# Patient Record
Sex: MALE | Race: WHITE | NOT HISPANIC OR LATINO | Employment: FULL TIME | URBAN - NONMETROPOLITAN AREA
[De-identification: names, ages, dates, MRNs, and addresses within clinical notes are randomized per-mention and may not be internally consistent; named-entity substitution may affect disease eponyms.]

---

## 2024-05-14 ENCOUNTER — APPOINTMENT (OUTPATIENT)
Dept: CARDIOLOGY | Facility: CLINIC | Age: 59
End: 2024-05-14
Payer: COMMERCIAL

## 2024-05-21 ENCOUNTER — APPOINTMENT (OUTPATIENT)
Dept: CARDIOLOGY | Facility: CLINIC | Age: 59
End: 2024-05-21
Payer: COMMERCIAL

## 2024-05-24 ENCOUNTER — OFFICE VISIT (OUTPATIENT)
Dept: CARDIOLOGY | Facility: CLINIC | Age: 59
End: 2024-05-24
Payer: COMMERCIAL

## 2024-05-24 VITALS
SYSTOLIC BLOOD PRESSURE: 150 MMHG | HEIGHT: 73 IN | HEART RATE: 72 BPM | BODY MASS INDEX: 39.36 KG/M2 | DIASTOLIC BLOOD PRESSURE: 90 MMHG | WEIGHT: 297 LBS

## 2024-05-24 DIAGNOSIS — R06.09 DYSPNEA ON EXERTION: Primary | ICD-10-CM

## 2024-05-24 DIAGNOSIS — G47.30 SLEEP APNEA TREATED WITH CONTINUOUS POSITIVE AIRWAY PRESSURE (CPAP): ICD-10-CM

## 2024-05-24 DIAGNOSIS — F17.200 SMOKER: ICD-10-CM

## 2024-05-24 DIAGNOSIS — I10 ESSENTIAL HYPERTENSION: ICD-10-CM

## 2024-05-24 DIAGNOSIS — Z82.49 FAMILY HISTORY OF ISCHEMIC HEART DISEASE: ICD-10-CM

## 2024-05-24 PROBLEM — G47.33 OBSTRUCTIVE SLEEP APNEA ON CPAP: Status: ACTIVE | Noted: 2024-05-24

## 2024-05-24 PROCEDURE — 93000 ELECTROCARDIOGRAM COMPLETE: CPT | Performed by: INTERNAL MEDICINE

## 2024-05-24 PROCEDURE — 99204 OFFICE O/P NEW MOD 45 MIN: CPT | Performed by: INTERNAL MEDICINE

## 2024-05-24 PROCEDURE — 3077F SYST BP >= 140 MM HG: CPT | Performed by: INTERNAL MEDICINE

## 2024-05-24 PROCEDURE — 3080F DIAST BP >= 90 MM HG: CPT | Performed by: INTERNAL MEDICINE

## 2024-05-24 PROCEDURE — 3008F BODY MASS INDEX DOCD: CPT | Performed by: INTERNAL MEDICINE

## 2024-05-24 RX ORDER — GABAPENTIN 600 MG/1
600 TABLET ORAL 2 TIMES DAILY
COMMUNITY

## 2024-05-24 RX ORDER — IBUPROFEN 600 MG/1
600 TABLET ORAL EVERY 6 HOURS PRN
COMMUNITY

## 2024-05-24 RX ORDER — ALBUTEROL SULFATE 0.83 MG/ML
2.5 SOLUTION RESPIRATORY (INHALATION)
COMMUNITY

## 2024-05-24 RX ORDER — LOSARTAN POTASSIUM AND HYDROCHLOROTHIAZIDE 12.5; 1 MG/1; MG/1
1 TABLET ORAL DAILY
COMMUNITY

## 2024-05-24 RX ORDER — BISMUTH SUBSALICYLATE 262 MG
1 TABLET,CHEWABLE ORAL DAILY
COMMUNITY

## 2024-05-24 NOTE — PROGRESS NOTES
Cardiology Consultation- New Consult    Reason for referral: Dyspnea on exertion    HPI: Anthony Mauricio is a 58 y.o. male who is referred for evaluation of symptoms of dyspnea on exertion and chest pain.  Patient has strong family history of coronary artery disease and has multiple risk factor for CAD including hypertension, active tobacco abuse and obesity.  He has sleep apnea using CPAP machine.  He works in a local [employer] and his job seems to be physically demanding.  He has noticed dyspnea on exertion recently and had one-time visit to the emergency department for evaluation where his workup was negative.  He has no previous cardiac investigations.  He has no syncope, palpitations, orthopnea PND or lower extremity edema.  He is hypertensive on medical therapy for the last 10 years.  His pressure was elevated today and his EKG revealed normal sinus rhythm with no significant abnormalities.  His cardiovascular and pulmonary examinations were normal.    Assessment/recommendations:    1-multiple risk factor for CAD with symptoms of dyspnea on exertion.  The patient was advised undergo treadmill stress test and coronary calcium score for further risk stratification.  Based on the results further advised to be provided  2-essential hypertension currently not controlled on present medical therapy.  Will address that further after completing his cardiac workup.  Low-salt diet, DASH diet was recommended weight loss was highly recommended  3-sleep apnea compliant with CPAP machine  4-class II obesity, encouragement provided for low calorie diet and more exercise.  5-patient was advised to obtain a set of blood work including lipid profile, CBC, basic metabolic profile and urine test.      Past Medical History:   He has no past medical history on file.    Surgical History:   He has a past surgical history that includes Hernia repair and Anterior cruciate ligament repair.    Family History:   Family History   Problem  "Relation Name Age of Onset    Heart attack Mother      Heart failure Mother      Diabetes type I Sister      Heart attack Brother         Social History:   Social History     Tobacco Use    Smoking status: Every Day     Types: Cigarettes    Smokeless tobacco: Never   Substance Use Topics    Alcohol use: Never        Allergies:  Penicillins     Current Medications:    Current Outpatient Medications:     albuterol 2.5 mg /3 mL (0.083 %) nebulizer solution, Take 3 mL (2.5 mg) by nebulization., Disp: , Rfl:     gabapentin (Neurontin) 600 mg tablet, Take 1 tablet (600 mg) by mouth 2 times a day., Disp: , Rfl:     ibuprofen 600 mg tablet, Take 1 tablet (600 mg) by mouth every 6 hours if needed for mild pain (1 - 3)., Disp: , Rfl:     losartan-hydrochlorothiazide (Hyzaar) 100-12.5 mg tablet, Take 1 tablet by mouth once daily., Disp: , Rfl:     multivitamin tablet, Take 1 tablet by mouth once daily., Disp: , Rfl:      Vitals:  Vitals:    05/24/24 0844 05/24/24 0845 05/24/24 0857   BP: (!) 148/108 (!) 150/102 150/90   BP Location: Left arm Right arm Right arm   Patient Position: Sitting Sitting Sitting   Pulse: 72     Weight: 135 kg (297 lb)     Height: 1.854 m (6' 1\")            ROS    Objective         Physical Exam  Constitutional:       Appearance: Normal appearance.   HENT:      Nose: Nose normal.   Neck:      Vascular: No carotid bruit.   Cardiovascular:      Rate and Rhythm: Normal rate.      Pulses: Normal pulses.      Heart sounds: Normal heart sounds.   Pulmonary:      Effort: Pulmonary effort is normal.   Abdominal:      General: Bowel sounds are normal.      Palpations: Abdomen is soft.   Musculoskeletal:         General: Normal range of motion.      Cervical back: Normal range of motion.      Right lower leg: No edema.      Left lower leg: No edema.   Skin:     General: Skin is warm and dry.   Neurological:      General: No focal deficit present.      Mental Status: He is alert.   Psychiatric:         Mood and " Affect: Mood normal.         Behavior: Behavior normal.         Thought Content: Thought content normal.         Judgment: Judgment normal.                Assessment and Plan:   1. Dyspnea on exertion        2. Essential hypertension  Follow Up In Cardiology    ECG 12 Lead    Stress Test    CT cardiac scoring wo IV contrast    Basic Metabolic Panel    CBC    Alanine Aminotransferase    Aspartate Aminotransferase    Uric Acid    Basic Metabolic Panel    CBC    Alanine Aminotransferase    Aspartate Aminotransferase    Uric Acid      3. BMI 39.0-39.9,adult        4. Smoker  CT cardiac scoring wo IV contrast    Lipid Panel    Basic Metabolic Panel    CBC    Alanine Aminotransferase    Aspartate Aminotransferase    Uric Acid    Lipid Panel    Basic Metabolic Panel    CBC    Alanine Aminotransferase    Aspartate Aminotransferase    Uric Acid      5. Family history of ischemic heart disease  ECG 12 Lead    Stress Test    CT cardiac scoring wo IV contrast    Lipid Panel    Basic Metabolic Panel    CBC    Alanine Aminotransferase    Aspartate Aminotransferase    Uric Acid    Lipid Panel    Basic Metabolic Panel    CBC    Alanine Aminotransferase    Aspartate Aminotransferase    Uric Acid      6. Sleep apnea treated with continuous positive airway pressure (CPAP)  ECG 12 Lead    Stress Test    CT cardiac scoring wo IV contrast    Lipid Panel    Basic Metabolic Panel    CBC    Alanine Aminotransferase    Aspartate Aminotransferase    Uric Acid    Lipid Panel    Basic Metabolic Panel    CBC    Alanine Aminotransferase    Aspartate Aminotransferase    Uric Acid             Scribe Attestation  By signing my name below, INoreen LPN, Scribe   attest that this documentation has been prepared under the direction and in the presence of Oscar Joy MD.    Provider Attestation - Scribe documentation    All medical record entries made by the Scribe were at my direction and personally dictated by me. I have reviewed the chart  and agree that the record accurately reflects my personal performance of the history, physical exam, discussion and plan.

## 2024-05-24 NOTE — PATIENT INSTRUCTIONS
Current weight: 135 kg (297 lb)  Weight change since last visit (-) denotes wt loss 297 lbs   Weight loss needed to achieve BMI 25: 107.9 Lbs  Weight loss needed to achieve BMI 30: 70.1 Lbs  Please bring all medicines, vitamins, and herbal supplements with you when you come to the office.    Prescriptions will not be filled unless you are compliant with your follow up appointments or have a follow up appointment scheduled as per instruction of your physician. Refills should be requested at the time of your visit.

## 2024-05-24 NOTE — LETTER
May 24, 2024     Raeann Cevallos, APRN-CNP  [address] [facility] [id number]    Patient: Anthony Mauricio   YOB: 1965   Date of Visit: 5/24/2024       Dear Dr. Raeann Cevallos, APRN-CNP:    Thank you for referring Anthony Mauricio to me for evaluation. Below are my notes for this consultation.  If you have questions, please do not hesitate to call me. I look forward to following your patient along with you.       Sincerely,     Oscar Joy MD      CC: No Recipients  ______________________________________________________________________________________    Cardiology Consultation- New Consult    Reason for referral: Dyspnea on exertion    HPI: Anthony Mauricio is a 58 y.o. male who is referred for evaluation of symptoms of dyspnea on exertion and chest pain.  Patient has strong family history of coronary artery disease and has multiple risk factor for CAD including hypertension, active tobacco abuse and obesity.  He has sleep apnea using CPAP machine.  He works in a local [employer] and his job seems to be physically demanding.  He has noticed dyspnea on exertion recently and had one-time visit to the emergency department for evaluation where his workup was negative.  He has no previous cardiac investigations.  He has no syncope, palpitations, orthopnea PND or lower extremity edema.  He is hypertensive on medical therapy for the last 10 years.  His pressure was elevated today and his EKG revealed normal sinus rhythm with no significant abnormalities.  His cardiovascular and pulmonary examinations were normal.    Assessment/recommendations:    1-multiple risk factor for CAD with symptoms of dyspnea on exertion.  The patient was advised undergo treadmill stress test and coronary calcium score for further risk stratification.  Based on the results further advised to be provided  2-essential hypertension currently not controlled on present medical  "therapy.  Will address that further after completing his cardiac workup.  Low-salt diet, DASH diet was recommended weight loss was highly recommended  3-sleep apnea compliant with CPAP machine  4-class II obesity, encouragement provided for low calorie diet and more exercise.  5-patient was advised to obtain a set of blood work including lipid profile, CBC, basic metabolic profile and urine test.      Past Medical History:   He has no past medical history on file.    Surgical History:   He has a past surgical history that includes Hernia repair and Anterior cruciate ligament repair.    Family History:   Family History   Problem Relation Name Age of Onset   • Heart attack Mother     • Heart failure Mother     • Diabetes type I Sister     • Heart attack Brother         Social History:   Social History     Tobacco Use   • Smoking status: Every Day     Types: Cigarettes   • Smokeless tobacco: Never   Substance Use Topics   • Alcohol use: Never        Allergies:  Penicillins     Current Medications:    Current Outpatient Medications:   •  albuterol 2.5 mg /3 mL (0.083 %) nebulizer solution, Take 3 mL (2.5 mg) by nebulization., Disp: , Rfl:   •  gabapentin (Neurontin) 600 mg tablet, Take 1 tablet (600 mg) by mouth 2 times a day., Disp: , Rfl:   •  ibuprofen 600 mg tablet, Take 1 tablet (600 mg) by mouth every 6 hours if needed for mild pain (1 - 3)., Disp: , Rfl:   •  losartan-hydrochlorothiazide (Hyzaar) 100-12.5 mg tablet, Take 1 tablet by mouth once daily., Disp: , Rfl:   •  multivitamin tablet, Take 1 tablet by mouth once daily., Disp: , Rfl:      Vitals:  Vitals:    05/24/24 0844 05/24/24 0845 05/24/24 0857   BP: (!) 148/108 (!) 150/102 150/90   BP Location: Left arm Right arm Right arm   Patient Position: Sitting Sitting Sitting   Pulse: 72     Weight: 135 kg (297 lb)     Height: 1.854 m (6' 1\")            ROS    Objective        Physical Exam  Constitutional:       Appearance: Normal appearance.   HENT:      Nose: " Nose normal.   Neck:      Vascular: No carotid bruit.   Cardiovascular:      Rate and Rhythm: Normal rate.      Pulses: Normal pulses.      Heart sounds: Normal heart sounds.   Pulmonary:      Effort: Pulmonary effort is normal.   Abdominal:      General: Bowel sounds are normal.      Palpations: Abdomen is soft.   Musculoskeletal:         General: Normal range of motion.      Cervical back: Normal range of motion.      Right lower leg: No edema.      Left lower leg: No edema.   Skin:     General: Skin is warm and dry.   Neurological:      General: No focal deficit present.      Mental Status: He is alert.   Psychiatric:         Mood and Affect: Mood normal.         Behavior: Behavior normal.         Thought Content: Thought content normal.         Judgment: Judgment normal.                Assessment and Plan:   1. Dyspnea on exertion        2. Essential hypertension  Follow Up In Cardiology    ECG 12 Lead    Stress Test    CT cardiac scoring wo IV contrast    Basic Metabolic Panel    CBC    Alanine Aminotransferase    Aspartate Aminotransferase    Uric Acid    Basic Metabolic Panel    CBC    Alanine Aminotransferase    Aspartate Aminotransferase    Uric Acid      3. BMI 39.0-39.9,adult        4. Smoker  CT cardiac scoring wo IV contrast    Lipid Panel    Basic Metabolic Panel    CBC    Alanine Aminotransferase    Aspartate Aminotransferase    Uric Acid    Lipid Panel    Basic Metabolic Panel    CBC    Alanine Aminotransferase    Aspartate Aminotransferase    Uric Acid      5. Family history of ischemic heart disease  ECG 12 Lead    Stress Test    CT cardiac scoring wo IV contrast    Lipid Panel    Basic Metabolic Panel    CBC    Alanine Aminotransferase    Aspartate Aminotransferase    Uric Acid    Lipid Panel    Basic Metabolic Panel    CBC    Alanine Aminotransferase    Aspartate Aminotransferase    Uric Acid      6. Sleep apnea treated with continuous positive airway pressure (CPAP)  ECG 12 Lead    Stress Test     CT cardiac scoring wo IV contrast    Lipid Panel    Basic Metabolic Panel    CBC    Alanine Aminotransferase    Aspartate Aminotransferase    Uric Acid    Lipid Panel    Basic Metabolic Panel    CBC    Alanine Aminotransferase    Aspartate Aminotransferase    Uric Acid             Scribe Attestation  By signing my name below, I, Ladonna Sorto LPN   attest that this documentation has been prepared under the direction and in the presence of Oscar Joy MD.    Provider Attestation - Scribe documentation    All medical record entries made by the Scribe were at my direction and personally dictated by me. I have reviewed the chart and agree that the record accurately reflects my personal performance of the history, physical exam, discussion and plan.

## 2024-06-17 ENCOUNTER — HOSPITAL ENCOUNTER (OUTPATIENT)
Dept: CARDIOLOGY | Facility: CLINIC | Age: 59
Discharge: HOME | End: 2024-06-17
Payer: COMMERCIAL

## 2024-06-17 DIAGNOSIS — G47.30 SLEEP APNEA TREATED WITH CONTINUOUS POSITIVE AIRWAY PRESSURE (CPAP): ICD-10-CM

## 2024-06-17 DIAGNOSIS — Z82.49 FAMILY HISTORY OF ISCHEMIC HEART DISEASE: ICD-10-CM

## 2024-06-17 DIAGNOSIS — I10 ESSENTIAL HYPERTENSION: ICD-10-CM

## 2024-06-17 PROCEDURE — 93017 CV STRESS TEST TRACING ONLY: CPT

## 2024-06-17 PROCEDURE — 93016 CV STRESS TEST SUPVJ ONLY: CPT | Performed by: INTERNAL MEDICINE

## 2024-06-17 PROCEDURE — 93018 CV STRESS TEST I&R ONLY: CPT | Performed by: INTERNAL MEDICINE

## 2024-07-01 ENCOUNTER — HOSPITAL ENCOUNTER (OUTPATIENT)
Dept: RADIOLOGY | Facility: HOSPITAL | Age: 59
Discharge: HOME | End: 2024-07-01
Payer: COMMERCIAL

## 2024-07-01 DIAGNOSIS — F17.200 SMOKER: ICD-10-CM

## 2024-07-01 DIAGNOSIS — G47.30 SLEEP APNEA TREATED WITH CONTINUOUS POSITIVE AIRWAY PRESSURE (CPAP): ICD-10-CM

## 2024-07-01 DIAGNOSIS — I10 ESSENTIAL HYPERTENSION: ICD-10-CM

## 2024-07-01 DIAGNOSIS — Z82.49 FAMILY HISTORY OF ISCHEMIC HEART DISEASE: ICD-10-CM

## 2024-07-01 PROCEDURE — 75571 CT HRT W/O DYE W/CA TEST: CPT

## 2024-07-03 DIAGNOSIS — R93.1 ELEVATED CORONARY ARTERY CALCIUM SCORE: ICD-10-CM

## 2024-07-03 NOTE — TELEPHONE ENCOUNTER
Informed patient of results, he verbalized understanding. Patient agreeable to medications, orders prepped and sent to Dr. Oscar Joy MD for signature.     Task sent to  to call and arrange once order signed.

## 2024-07-03 NOTE — TELEPHONE ENCOUNTER
Result Communication    Resulted Orders   CT cardiac scoring wo IV contrast    Addendum: 7/2/2024    Interpreted By:  Sami William,   ADDENDUM:  Technical: The following is to serve as an over-read for an  unenhanced cardiac CT, to evaluate the extra vascular structures.      Contiguous unenhanced CT sections are performed from level the alexia  to the upper abdomen.              Findings: There are linear areas of scarring or atelectasis in the  anterior left lower lobe. The visualized portions of both lungs are  otherwise clear.      There is no sign of pathologic lymph node enlargement. There is no  pericardial or pleural effusion.      Images through the upper abdomen are unremarkable aside from a tiny  hiatal hernia.      The visualized osseous and soft tissue structures of the chest wall  are intact.              Impression: Tiny hiatal hernia. The extra vascular structures are  otherwise unremarkable.      Signed by: Sami William 7/2/2024 11:53 AM      -------- ORIGINAL REPORT --------  Dictation workstation:   [id number]      Narrative    Interpreted By:  Sami Vernon,   STUDY:  CT CARDIAC SCORING WO IV CONTRAST; 7/1/2024 4:00 pm      INDICATION:  Signs/Symptoms:hypertension, COPD, family history.      COMPARISON:  None.      ACCESSION NUMBER(S):  [id number]      ORDERING CLINICIAN:  ANAMARIA BISHOP      TECHNIQUE:  Using prospective ECG gating, CT scan of the coronary arteries was  performed without intravenous contrast. Coronary calcium scoring  was  performed according to the method of Agatston.      CT Dose-Length Product (DLP): 63.3 mGy*cm  CT Dose Reduction Employed: Yes, prospective gating, iterative  reconstruction.      FINDINGS:  The score and distribution of calcium in the coronary arteries is as  follows:      LM 0  LAD 1409  LCx 84  RCA 1188      Total 2680      The visualized ascending thoracic aorta measures 3.8 cm in diameter.  The heart is normal in size. No pericardial  effusion is present.          The main pulmonary artery, right and left pulmonary artery are normal  in size.            Impression    1. Coronary artery calcium score of 2680*.  2. FAGAN 99th percentile** for age, gender, and race in asymptomatic  patients.          *Coronary Artery  Agatston score      Score risk  Very low 1-99  Mildly increased 100-299  Moderately increased >300  Moderate to severely increased >800      Citlali et al. JCCT 2016 (http://dx.doi.org/10.1016/j.jcct.2016.11.003)      ** FAGAN Percentile  In general, greater than 75th percentile for age, gender, and race is  considered to be a higher relative risk and higher lifetime risk  condition. Greater than 75th percentile=moderate to severely  increased relative risk irrespective of the score. Advise using AFGAN  10 year CHD risk calculator below for better discrimination of risk.      FAGAN 10-Year CHD Risk with Coronary Artery Calcification can be  calcuate using link below  https://www.fagan-nhlbi.org/MESACHDRisk/[email or web address]/RiskScore.aspx  Sepideh infante al. JACC 2015 (http://dx.doi.org/10.1016/j.j  acc.2015.08.035)      Reading Cardiologist: Dr. Sami Vernon, Date: 7/2/2024 11:49 am      Signed by: Sami Vernon 7/2/2024 11:49 AM  Dictation workstation:   [id number]       12:54 PM      Results were successfully communicated with the patient and they acknowledged their understanding.

## 2024-07-03 NOTE — TELEPHONE ENCOUNTER
----- Message from Oscar Joy MD sent at 7/3/2024 10:54 AM EDT -----  Let him know his coronary calcium score came very high and the disease is located in the front vessel and on the right side vessel.  Given his symptoms of chest pain and his risk factor for coronary artery disease and with his coronary calcium score a cardiac catheterization is recommended, if he is not taking aspirin have him take a baby aspirin and need to be on statin and my recommendation would be rosuvastatin 20 mg daily  ----- Message -----  From: Interface, Radiology Results In  Sent: 7/2/2024  11:51 AM EDT  To: Oscar Joy MD

## 2024-07-04 RX ORDER — ROSUVASTATIN CALCIUM 20 MG/1
20 TABLET, COATED ORAL DAILY
Qty: 90 TABLET | Refills: 3 | Status: SHIPPED | OUTPATIENT
Start: 2024-07-04 | End: 2025-07-04

## 2024-07-04 RX ORDER — ASPIRIN 81 MG/1
81 TABLET ORAL DAILY
Qty: 90 TABLET | Refills: 3 | Status: SHIPPED | OUTPATIENT
Start: 2024-07-04 | End: 2025-07-04

## 2024-07-05 ENCOUNTER — TELEPHONE (OUTPATIENT)
Dept: CARDIOLOGY | Facility: CLINIC | Age: 59
End: 2024-07-05
Payer: COMMERCIAL

## 2024-07-05 NOTE — TELEPHONE ENCOUNTER
HEART CATH/[id number] [id number] NO AUTH REQUIRED PER [facility]

## 2024-07-15 LAB
NON-UH HIE ANION GAP: 9.6 (ref 6–15)
NON-UH HIE BASOPHILS # (AUTO): 0.1 10*3/UL (ref 0–0.2)
NON-UH HIE BASOPHILS % (AUTO): 0.7 %
NON-UH HIE BLOOD UREA NITROGEN: 17 MG/DL (ref 7–25)
NON-UH HIE CARBON DIOXIDE: 27.4 MMOL/L (ref 21–31)
NON-UH HIE CHLORIDE: 104 MMOL/L (ref 98–107)
NON-UH HIE CHOL/HDL RATIO: 5.3
NON-UH HIE CHOLESTEROL: 179 MG/DL (ref 140–200)
NON-UH HIE CREATININE CLR CALC PHARMACY: 111.15
NON-UH HIE CREATININE: 1.07 MG/DL (ref 0.7–1.3)
NON-UH HIE EOSINOPHILS # (AUTO): 0.1 10*3/UL (ref 0–0.45)
NON-UH HIE EOSINOPHILS % (AUTO): 2.1 %
NON-UH HIE ESTIMATED GFR: > 60
NON-UH HIE HDL CHOLESTEROL: 34 MG/DL (ref 23–92)
NON-UH HIE HEMATOCRIT: 45.9 % (ref 38.8–50)
NON-UH HIE HEMOGLOBIN: 15.5 G/DL (ref 13–17)
NON-UH HIE INR: 1
NON-UH HIE LDL CHOLESTEROL,CALCULATED: 108 MG/DL (ref 0–100)
NON-UH HIE LYMPHOCYTES # (AUTO): 1.4 10*3/UL (ref 1–4.8)
NON-UH HIE LYMPHOCYTES % (AUTO): 20.2 %
NON-UH HIE MEAN CORPUSCULAR HEMOGLOBIN: 29.4 PG (ref 27.5–35.2)
NON-UH HIE MEAN CORPUSCULAR HGB CONC: 33.8 G/DL (ref 32.5–35.6)
NON-UH HIE MEAN CORPUSCULAR VOLUME: 86.9 FL (ref 83.5–101)
NON-UH HIE MEAN PLATELET VOLUME: 7.7 FL (ref 6.6–10.1)
NON-UH HIE MONOCYTES # (AUTO): 0.6 10*3/UL (ref 0–0.8)
NON-UH HIE MONOCYTES % (AUTO): 9 %
NON-UH HIE NEUTROPHILS # (AUTO): 4.8 10*3/UL (ref 1.8–7.7)
NON-UH HIE NEUTROPHILS % (AUTO): 68 %
NON-UH HIE NRBC%: 0.1 /100{WBC} (ref 0–0.5)
NON-UH HIE PARTIAL THROMBOPLASTIN TIME: 34.8 S (ref 25.1–36.5)
NON-UH HIE PLATELET COUNT: 233 10*3/UL (ref 150–450)
NON-UH HIE POTASSIUM: 4 MMOL/L (ref 3.5–5.1)
NON-UH HIE PROTHROMBIN TIME: 11.1 S (ref 9–12.9)
NON-UH HIE RED BLOOD COUNT: 5.27 (ref 3.9–5.6)
NON-UH HIE RED CELL DISTRIBUTION WIDTH: 14.9 % (ref 12–14.8)
NON-UH HIE SODIUM: 137 MMOL/L (ref 136–145)
NON-UH HIE TRIGLYCERIDE W/REFLEX: 185 MG/DL (ref 0–149)
NON-UH HIE UNCORRECTED WBC: 7 10*3/UL (ref 4.1–10.5)
NON-UH HIE VLDL CHOLESTEROL: 37 MG/DL
NON-UH HIE WHITE BLOOD COUNT: 7 10*3/UL (ref 4.1–10.5)

## 2024-07-16 ENCOUNTER — TELEPHONE (OUTPATIENT)
Dept: CARDIOLOGY | Facility: CLINIC | Age: 59
End: 2024-07-16
Payer: COMMERCIAL

## 2024-07-16 ENCOUNTER — DOCUMENTATION (OUTPATIENT)
Dept: CARDIOLOGY | Facility: CLINIC | Age: 59
End: 2024-07-16
Payer: COMMERCIAL

## 2024-07-16 NOTE — TELEPHONE ENCOUNTER
Patient is being discharged from [facility]. TCM appt has been made for 7/25 with Dr Joy S/P ANSELMO.

## 2024-07-17 ENCOUNTER — PATIENT OUTREACH (OUTPATIENT)
Dept: CARDIOLOGY | Facility: CLINIC | Age: 59
End: 2024-07-17
Payer: COMMERCIAL

## 2024-07-17 RX ORDER — CLOPIDOGREL BISULFATE 75 MG/1
75 TABLET ORAL DAILY
COMMUNITY

## 2024-07-17 RX ORDER — ATORVASTATIN CALCIUM 40 MG/1
40 TABLET, FILM COATED ORAL DAILY
COMMUNITY

## 2024-07-17 NOTE — PROGRESS NOTES
Discharge Facility:  [facility]  Discharge Diagnosis:  stented coronary artery, HTN, Hyperlipidemia, s/p PCI of the RCA abd anterior descending artery  Admission Date:  7/15/24  Discharge Date: 7/16/24    PCP Appointment Date: [facility] - General   Specialist Appointment Date: Dr Joy 7/25/24  Hospital Encounter and Summary Linked: Yes  See discharge assessment below for further details     Engagement  Call Start Time: 0901 (7/17/2024  9:13 AM)    Medications  Medications reviewed with patient/caregiver?: Yes (7/17/2024  9:13 AM)  Is the patient having any side effects they believe may be caused by any medication additions or changes?: No (7/17/2024  9:13 AM)  Does the patient have all medications ordered at discharge?: Yes (7/17/2024  9:13 AM)  Prescription Comments: Asa, Plavix and atorvstatin (7/17/2024  9:13 AM)  Is the patient taking all medications as directed (includes completed medication regime)?: Yes (7/17/2024  9:13 AM)  Medication Comments: n/a (7/17/2024  9:13 AM)    Appointments  Does the patient have a primary care provider?: Yes (7/17/2024  9:13 AM)  Care Management Interventions: Advised patient to make appointment (7/17/2024  9:13 AM)  Care Management Interventions: Advised patient to keep appointment (7/17/2024  9:13 AM)    Self Management  What is the home health agency?: n/a (7/17/2024  9:13 AM)  What Durable Medical Equipment (DME) was ordered?: n/a (7/17/2024  9:13 AM)    Patient Teaching  Does the patient have access to their discharge instructions?: Yes (7/17/2024  9:13 AM)  Care Management Interventions: Reviewed instructions with patient (7/17/2024  9:13 AM)  What is the patient's perception of their health status since discharge?: Improving (7/17/2024  9:13 AM)  Patient/Caregiver Education Comments: Reviewed monitoring and caring for groin site. Reviewed new medications. Reviewed appts (7/17/2024  9:13 AM)    Wrap Up  Wrap Up Additional Comments: Patient  denies CP or SOB. Patient states that groin site is without drainage or increased pain, redness or swelling. Patient is aware of follow up appt and encourage to make an appt with PCP. Reviewed new medications and patient has an understanding of indications. This CM offered contact information for non-urgent needs (7/17/2024  9:13 AM)

## 2024-07-19 ENCOUNTER — TELEPHONE (OUTPATIENT)
Dept: CARDIOLOGY | Facility: CLINIC | Age: 59
End: 2024-07-19
Payer: COMMERCIAL

## 2024-07-19 NOTE — TELEPHONE ENCOUNTER
"Patient calls in stating that he had two stents placed on Monday. He reports that he was at work doing some light duties, no heavy lifting or anything of the sort and he has started to experience chest pain that is not going away. He reports it has been present for 15-20 minutes and it has not let up. He denies shortness of breath and dizziness. Patient reports \"I just feel weird.\" He inquired if he should go to the ER. Advised patient to go to ER. He verbalized understanding and states he is heading there now.   "

## 2024-07-25 ENCOUNTER — APPOINTMENT (OUTPATIENT)
Dept: CARDIOLOGY | Facility: CLINIC | Age: 59
End: 2024-07-25
Payer: COMMERCIAL

## 2024-07-25 VITALS
HEART RATE: 70 BPM | HEIGHT: 73 IN | SYSTOLIC BLOOD PRESSURE: 122 MMHG | DIASTOLIC BLOOD PRESSURE: 84 MMHG | BODY MASS INDEX: 37.11 KG/M2 | WEIGHT: 280 LBS

## 2024-07-25 DIAGNOSIS — E78.2 MIXED HYPERLIPIDEMIA: ICD-10-CM

## 2024-07-25 DIAGNOSIS — I25.10 CORONARY ARTERY DISEASE INVOLVING NATIVE CORONARY ARTERY OF NATIVE HEART WITHOUT ANGINA PECTORIS: Primary | ICD-10-CM

## 2024-07-25 DIAGNOSIS — Z98.61 S/P PTCA (PERCUTANEOUS TRANSLUMINAL CORONARY ANGIOPLASTY): ICD-10-CM

## 2024-07-25 DIAGNOSIS — G47.30 SLEEP APNEA TREATED WITH CONTINUOUS POSITIVE AIRWAY PRESSURE (CPAP): ICD-10-CM

## 2024-07-25 DIAGNOSIS — E66.9 CLASS 2 OBESITY: ICD-10-CM

## 2024-07-25 DIAGNOSIS — I10 ESSENTIAL HYPERTENSION: ICD-10-CM

## 2024-07-25 DIAGNOSIS — F17.200 SMOKER: ICD-10-CM

## 2024-07-25 PROCEDURE — 99495 TRANSJ CARE MGMT MOD F2F 14D: CPT | Performed by: INTERNAL MEDICINE

## 2024-07-25 PROCEDURE — 3008F BODY MASS INDEX DOCD: CPT | Performed by: INTERNAL MEDICINE

## 2024-07-25 PROCEDURE — 4004F PT TOBACCO SCREEN RCVD TLK: CPT | Performed by: INTERNAL MEDICINE

## 2024-07-25 PROCEDURE — 3074F SYST BP LT 130 MM HG: CPT | Performed by: INTERNAL MEDICINE

## 2024-07-25 PROCEDURE — 3079F DIAST BP 80-89 MM HG: CPT | Performed by: INTERNAL MEDICINE

## 2024-07-25 RX ORDER — LOSARTAN POTASSIUM AND HYDROCHLOROTHIAZIDE 12.5; 1 MG/1; MG/1
1 TABLET ORAL DAILY
Qty: 90 TABLET | Refills: 3 | Status: SHIPPED | OUTPATIENT
Start: 2024-07-25 | End: 2025-07-25

## 2024-07-25 NOTE — LETTER
July 25, 2024     Raeann Cevallos, APRN-CNP  [address] [facility] [address]    Patient: Anthony Mauricio   YOB: 1965   Date of Visit: 7/25/2024       Dear Dr. Raeann Cevallos, APRN-CNP:    Thank you for referring Anthony Mauricio to me for evaluation. Below are my notes for this consultation.  If you have questions, please do not hesitate to call me. I look forward to following your patient along with you.       Sincerely,     Oscar Joy MD      CC: No Recipients  ______________________________________________________________________________________    Subjective   Anthony Mauricio is a 58 y.o. male       Chief Complaint    Follow-up          HPI   Patient is in the office for TCM visit after recent admission to [facility] for cardiac catheterization which demonstrated two-vessel coronary disease involving the LAD and the right coronary artery both of which required drug-eluting stents, his left main and left circumflex have no significant disease ejection fraction was normal.  He had a visit to the emergency department a few days after discharge from the hospital after the PCI for atypical chest pain his workup was negative and no action was taken.  He is now modifying his lifestyle and a very positive way, he is quitting tobacco use, doing exercise and watching his diet carefully.  He is advised on cardiac rehab which he is agreeable to.  His medical therapy was reviewed and seem to be well-tolerated.  Examination essentially normal for class II obesity.  The findings of cardiac catheterization and interventions were reviewed again with the patient.    Assessment/recommendations:     1-coronary artery disease involving the LAD and the right coronary artery status post PCI July 2024 with excellent result, his left main and left circumflex are normal, ejection fraction is normal.  Currently on dual antiplatelet therapy for at least 1 year.   "High intensity statin, exercise and cardiac rehab with low calorie diet and heart healthy diet was recommended.  He seems to be highly motivated.  2-essential hypertension currently under control, recommended continue current medications, weight loss was highly recommended, DASH diet  3-obstructive sleep apnea compliant with CPAP machine  4-class II obesity, encouragement provided for low calorie diet and more exercise.    Review of Systems   Constitutional: Positive for malaise/fatigue.   All other systems reviewed and are negative.           Vitals:    07/25/24 0959 07/25/24 1019   BP: (!) 124/94 122/84   BP Location: Left arm Left arm   Patient Position: Sitting Sitting   Pulse: 70    Weight: 127 kg (280 lb)    Height: 1.854 m (6' 1\")         Objective   Physical Exam  Constitutional:       Appearance: Normal appearance.   HENT:      Nose: Nose normal.   Neck:      Vascular: No carotid bruit.   Cardiovascular:      Rate and Rhythm: Normal rate.      Pulses: Normal pulses.      Heart sounds: Normal heart sounds.   Pulmonary:      Effort: Pulmonary effort is normal.   Abdominal:      General: Bowel sounds are normal.      Palpations: Abdomen is soft.   Musculoskeletal:         General: Normal range of motion.      Cervical back: Normal range of motion.      Right lower leg: No edema.      Left lower leg: No edema.   Skin:     General: Skin is warm and dry.   Neurological:      General: No focal deficit present.      Mental Status: He is alert.   Psychiatric:         Mood and Affect: Mood normal.         Behavior: Behavior normal.         Thought Content: Thought content normal.         Judgment: Judgment normal.         Allergies  Penicillins     Current Medications    Current Outpatient Medications:   •  aspirin 81 mg EC tablet, Take 1 tablet (81 mg) by mouth once daily., Disp: 90 tablet, Rfl: 3  •  atorvastatin (Lipitor) 40 mg tablet, Take 1 tablet (40 mg) by mouth once daily., Disp: , Rfl:   •  clopidogrel " (Plavix) 75 mg tablet, Take 1 tablet (75 mg) by mouth once daily., Disp: , Rfl:   •  multivitamin tablet, Take 1 tablet by mouth once daily., Disp: , Rfl:   •  rosuvastatin (Crestor) 20 mg tablet, Take 1 tablet (20 mg) by mouth once daily., Disp: 90 tablet, Rfl: 3  •  losartan-hydrochlorothiazide (Hyzaar) 100-12.5 mg tablet, Take 1 tablet by mouth once daily., Disp: 90 tablet, Rfl: 3                     Assessment/Plan   1. Coronary artery disease involving native coronary artery of native heart without angina pectoris  Follow Up In Cardiology    Aspartate Aminotransferase    Alanine Aminotransferase    CBC    Lipid Panel    Referral to Cardiac Rehab    losartan-hydrochlorothiazide (Hyzaar) 100-12.5 mg tablet    Aspartate Aminotransferase    Alanine Aminotransferase    CBC    Lipid Panel      2. S/P PTCA (percutaneous transluminal coronary angioplasty)  CBC    Referral to Cardiac Rehab    CBC      3. Essential hypertension  Basic Metabolic Panel    CBC    losartan-hydrochlorothiazide (Hyzaar) 100-12.5 mg tablet    Basic Metabolic Panel    CBC      4. Sleep apnea treated with continuous positive airway pressure (CPAP)        5. BMI 36.0-36.9,adult        6. Smoker        7. Mixed hyperlipidemia  Aspartate Aminotransferase    Alanine Aminotransferase    Lipid Panel    Aspartate Aminotransferase    Alanine Aminotransferase    Lipid Panel               Scribe Attestation  By signing my name below, I, Ladonna Pollock LPN   attest that this documentation has been prepared under the direction and in the presence of Oscar Joy MD.     Provider Attestation - Scribe documentation    All medical record entries made by the Scribe were at my direction and personally dictated by me. I have reviewed the chart and agree that the record accurately reflects my personal performance of the history, physical exam, discussion and plan.

## 2024-07-25 NOTE — PROGRESS NOTES
Subjective   Anthony Mauricio is a 58 y.o. male       Chief Complaint    Follow-up          HPI   Patient is in the office for TCM visit after recent admission to [facility] for cardiac catheterization which demonstrated two-vessel coronary disease involving the LAD and the right coronary artery both of which required drug-eluting stents, his left main and left circumflex have no significant disease ejection fraction was normal.  He had a visit to the emergency department a few days after discharge from the hospital after the PCI for atypical chest pain his workup was negative and no action was taken.  He is now modifying his lifestyle and a very positive way, he is quitting tobacco use, doing exercise and watching his diet carefully.  He is advised on cardiac rehab which he is agreeable to.  His medical therapy was reviewed and seem to be well-tolerated.  Examination essentially normal for class II obesity.  The findings of cardiac catheterization and interventions were reviewed again with the patient.    Assessment/recommendations:     1-coronary artery disease involving the LAD and the right coronary artery status post PCI July 2024 with excellent result, his left main and left circumflex are normal, ejection fraction is normal.  Currently on dual antiplatelet therapy for at least 1 year.  High intensity statin, exercise and cardiac rehab with low calorie diet and heart healthy diet was recommended.  He seems to be highly motivated.  2-essential hypertension currently under control, recommended continue current medications, weight loss was highly recommended, DASH diet  3-obstructive sleep apnea compliant with CPAP machine  4-class II obesity, encouragement provided for low calorie diet and more exercise.    Review of Systems   Constitutional: Positive for malaise/fatigue.   All other systems reviewed and are negative.           Vitals:    07/25/24 0959 07/25/24 1019   BP: (!) 124/94 122/84   BP Location: Left arm  "Left arm   Patient Position: Sitting Sitting   Pulse: 70    Weight: 127 kg (280 lb)    Height: 1.854 m (6' 1\")         Objective   Physical Exam  Constitutional:       Appearance: Normal appearance.   HENT:      Nose: Nose normal.   Neck:      Vascular: No carotid bruit.   Cardiovascular:      Rate and Rhythm: Normal rate.      Pulses: Normal pulses.      Heart sounds: Normal heart sounds.   Pulmonary:      Effort: Pulmonary effort is normal.   Abdominal:      General: Bowel sounds are normal.      Palpations: Abdomen is soft.   Musculoskeletal:         General: Normal range of motion.      Cervical back: Normal range of motion.      Right lower leg: No edema.      Left lower leg: No edema.   Skin:     General: Skin is warm and dry.   Neurological:      General: No focal deficit present.      Mental Status: He is alert.   Psychiatric:         Mood and Affect: Mood normal.         Behavior: Behavior normal.         Thought Content: Thought content normal.         Judgment: Judgment normal.         Allergies  Penicillins     Current Medications    Current Outpatient Medications:     aspirin 81 mg EC tablet, Take 1 tablet (81 mg) by mouth once daily., Disp: 90 tablet, Rfl: 3    atorvastatin (Lipitor) 40 mg tablet, Take 1 tablet (40 mg) by mouth once daily., Disp: , Rfl:     clopidogrel (Plavix) 75 mg tablet, Take 1 tablet (75 mg) by mouth once daily., Disp: , Rfl:     multivitamin tablet, Take 1 tablet by mouth once daily., Disp: , Rfl:     rosuvastatin (Crestor) 20 mg tablet, Take 1 tablet (20 mg) by mouth once daily., Disp: 90 tablet, Rfl: 3    losartan-hydrochlorothiazide (Hyzaar) 100-12.5 mg tablet, Take 1 tablet by mouth once daily., Disp: 90 tablet, Rfl: 3                     Assessment/Plan   1. Coronary artery disease involving native coronary artery of native heart without angina pectoris  Follow Up In Cardiology    Aspartate Aminotransferase    Alanine Aminotransferase    CBC    Lipid Panel    Referral to " Cardiac Rehab    losartan-hydrochlorothiazide (Hyzaar) 100-12.5 mg tablet    Aspartate Aminotransferase    Alanine Aminotransferase    CBC    Lipid Panel      2. S/P PTCA (percutaneous transluminal coronary angioplasty)  CBC    Referral to Cardiac Rehab    CBC      3. Essential hypertension  Basic Metabolic Panel    CBC    losartan-hydrochlorothiazide (Hyzaar) 100-12.5 mg tablet    Basic Metabolic Panel    CBC      4. Sleep apnea treated with continuous positive airway pressure (CPAP)        5. BMI 36.0-36.9,adult        6. Smoker        7. Mixed hyperlipidemia  Aspartate Aminotransferase    Alanine Aminotransferase    Lipid Panel    Aspartate Aminotransferase    Alanine Aminotransferase    Lipid Panel               Scribe Attestation  By signing my name below, I, Ladonna Pollock LPN   attest that this documentation has been prepared under the direction and in the presence of Oscar Joy MD.     Provider Attestation - Scribe documentation    All medical record entries made by the Scribe were at my direction and personally dictated by me. I have reviewed the chart and agree that the record accurately reflects my personal performance of the history, physical exam, discussion and plan.

## 2024-07-25 NOTE — PATIENT INSTRUCTIONS
Please bring all medicines, vitamins, and herbal supplements with you when you come to the office.    Prescriptions will not be filled unless you are compliant with your follow up appointments or have a follow up appointment scheduled as per instruction of your physician. Refills should be requested at the time of your visit.     BMI was above normal measurement. Current weight: 127 kg (280 lb)  Weight change since last visit (-) denotes wt loss -17 lbs   Weight loss needed to achieve BMI 25: 90.9 Lbs  Weight loss needed to achieve BMI 30: 53.1 Lbs  Provided instructions on dietary changes  Provided instructions on exercise.

## 2024-07-31 ENCOUNTER — PATIENT OUTREACH (OUTPATIENT)
Dept: CARDIOLOGY | Facility: CLINIC | Age: 59
End: 2024-07-31
Payer: COMMERCIAL

## 2024-07-31 NOTE — PROGRESS NOTES
Call regarding appt. with cardiology on 7/25/24 after hospitalization.  At time of outreach call the patient feels as if their condition has improved since last visit.  Reviewed the PCP appointment with the pt and addressed any questions or concerns.

## 2024-08-13 ENCOUNTER — TELEPHONE (OUTPATIENT)
Dept: CARDIOLOGY | Facility: CLINIC | Age: 59
End: 2024-08-13
Payer: COMMERCIAL

## 2024-08-13 NOTE — TELEPHONE ENCOUNTER
Patient phoned that he is struggling to quit smoking and would like to consider Chantix. PCP is willing to prescribed but wanted cardiac approval first. Please advise.    To Dr. Oscar Joy MD

## 2024-08-30 ENCOUNTER — PATIENT OUTREACH (OUTPATIENT)
Dept: CARDIOLOGY | Facility: CLINIC | Age: 59
End: 2024-08-30
Payer: COMMERCIAL

## 2024-10-01 ENCOUNTER — PATIENT OUTREACH (OUTPATIENT)
Dept: CARDIOLOGY | Facility: CLINIC | Age: 59
End: 2024-10-01
Payer: COMMERCIAL

## 2024-11-22 ENCOUNTER — APPOINTMENT (OUTPATIENT)
Dept: CARDIOLOGY | Facility: CLINIC | Age: 59
End: 2024-11-22
Payer: COMMERCIAL

## 2025-01-07 ENCOUNTER — APPOINTMENT (OUTPATIENT)
Dept: CARDIOLOGY | Facility: CLINIC | Age: 60
End: 2025-01-07
Payer: COMMERCIAL

## 2025-05-09 ENCOUNTER — APPOINTMENT (OUTPATIENT)
Dept: CARDIOLOGY | Facility: CLINIC | Age: 60
End: 2025-05-09
Payer: COMMERCIAL

## 2025-05-09 VITALS
SYSTOLIC BLOOD PRESSURE: 128 MMHG | HEART RATE: 78 BPM | HEIGHT: 73 IN | DIASTOLIC BLOOD PRESSURE: 86 MMHG | BODY MASS INDEX: 38.57 KG/M2 | WEIGHT: 291 LBS

## 2025-05-09 DIAGNOSIS — I25.10 CORONARY ARTERY DISEASE INVOLVING NATIVE CORONARY ARTERY OF NATIVE HEART WITHOUT ANGINA PECTORIS: Primary | ICD-10-CM

## 2025-05-09 DIAGNOSIS — Z98.61 S/P PTCA (PERCUTANEOUS TRANSLUMINAL CORONARY ANGIOPLASTY): ICD-10-CM

## 2025-05-09 DIAGNOSIS — I10 ESSENTIAL HYPERTENSION: ICD-10-CM

## 2025-05-09 DIAGNOSIS — Z87.891 FORMER SMOKER: ICD-10-CM

## 2025-05-09 DIAGNOSIS — G47.33 OBSTRUCTIVE SLEEP APNEA (ADULT) (PEDIATRIC): ICD-10-CM

## 2025-05-09 DIAGNOSIS — E78.2 MIXED HYPERLIPIDEMIA: ICD-10-CM

## 2025-05-09 DIAGNOSIS — R93.1 ELEVATED CORONARY ARTERY CALCIUM SCORE: ICD-10-CM

## 2025-05-09 DIAGNOSIS — E66.812 CLASS 2 OBESITY: ICD-10-CM

## 2025-05-09 PROCEDURE — 3074F SYST BP LT 130 MM HG: CPT | Performed by: INTERNAL MEDICINE

## 2025-05-09 PROCEDURE — 3079F DIAST BP 80-89 MM HG: CPT | Performed by: INTERNAL MEDICINE

## 2025-05-09 PROCEDURE — 99214 OFFICE O/P EST MOD 30 MIN: CPT | Performed by: INTERNAL MEDICINE

## 2025-05-09 PROCEDURE — 3008F BODY MASS INDEX DOCD: CPT | Performed by: INTERNAL MEDICINE

## 2025-05-09 RX ORDER — ROSUVASTATIN CALCIUM 20 MG/1
20 TABLET, COATED ORAL DAILY
Qty: 90 TABLET | Refills: 3 | Status: SHIPPED | OUTPATIENT
Start: 2025-05-09 | End: 2026-05-09

## 2025-05-09 RX ORDER — VARENICLINE TARTRATE 1 MG/1
1 TABLET, FILM COATED ORAL DAILY
COMMUNITY
Start: 2025-04-13

## 2025-05-09 NOTE — PROGRESS NOTES
"Chief Complaint   Patient presents with    Follow-up     Hypertension        Subjective   Anthony Mauricio is a 59 y.o. male     HPI     Patient is in the office for follow-up for CAD and previous angioplasty in 2024 of 2 vessels involving the RCA and the left anterior descending artery.  He has quit smoking since he was last seen which is great.  He continues to work without any restrictions.  For some reason his statin was discontinued by his PCP.  He has no side effect of the statin.  He reports no angina orthopnea PND or lower extremity edema he remains in class II obesity.  He maintains very active lifestyle.  Examination outside of his class II obesity were essentially normal.    Assessment/recommendations:     1-coronary artery disease involving the LAD and the right coronary artery status post PCI July 2024 with excellent result, his left main and left circumflex are normal, ejection fraction is normal.  Currently on dual antiplatelet therapy, for some reason his statin was discontinued by PCP without any complaint from the patient.  He will be place back on rosuvastatin 20 mg daily and follow his labs in 2 months.  2-essential hypertension currently under control, recommended continue current medications, weight loss was highly recommended, DASH diet  3-obstructive sleep apnea compliant with CPAP machine  4-class II obesity, encouragement provided for low calorie diet and more exercise.  Review of Systems   All other systems reviewed and are negative.           Vitals:    05/09/25 1443   BP: 128/86   BP Location: Left arm   Patient Position: Sitting   Pulse: 78   Weight: 132 kg (291 lb)   Height: 1.854 m (6' 1\")        Objective   Physical Exam  Constitutional:       Appearance: Normal appearance.   HENT:      Nose: Nose normal.   Neck:      Vascular: No carotid bruit.   Cardiovascular:      Rate and Rhythm: Normal rate.      Pulses: Normal pulses.      Heart sounds: Normal heart sounds.   Pulmonary:      " Effort: Pulmonary effort is normal.   Abdominal:      General: Bowel sounds are normal.      Palpations: Abdomen is soft.   Musculoskeletal:         General: Normal range of motion.      Cervical back: Normal range of motion.      Right lower leg: No edema.      Left lower leg: No edema.   Skin:     General: Skin is warm and dry.   Neurological:      General: No focal deficit present.      Mental Status: He is alert.   Psychiatric:         Mood and Affect: Mood normal.         Behavior: Behavior normal.         Thought Content: Thought content normal.         Judgment: Judgment normal.         Allergies  Penicillins     Current Medications  Current Outpatient Medications   Medication Instructions    aspirin 81 mg, oral, Daily    clopidogrel (PLAVIX) 75 mg, Daily    losartan-hydrochlorothiazide (Hyzaar) 100-12.5 mg tablet 1 tablet, oral, Daily    multivitamin tablet 1 tablet, Daily    rosuvastatin (CRESTOR) 20 mg, oral, Daily    varenicline tartrate (CHANTIX) 1 mg, Daily                        Assessment/Plan   1. Coronary artery disease involving native coronary artery of native heart without angina pectoris  Alanine Aminotransferase    Basic Metabolic Panel    CBC    Lipid Panel    Follow Up In Cardiology    Aspartate Aminotransferase    Alanine Aminotransferase    Basic Metabolic Panel    CBC    Lipid Panel    Aspartate Aminotransferase      2. Elevated coronary artery calcium score  rosuvastatin (Crestor) 20 mg tablet      3. Essential hypertension  Follow Up In Cardiology    Basic Metabolic Panel    Basic Metabolic Panel      4. S/P PTCA (percutaneous transluminal coronary angioplasty)        5. Obstructive sleep apnea (adult) (pediatric)        6. BMI 38.0-38.9,adult        7. Former smoker        8. Mixed hyperlipidemia  Alanine Aminotransferase    Lipid Panel    Aspartate Aminotransferase    Alanine Aminotransferase    Lipid Panel    Aspartate Aminotransferase      9. Class 2 obesity                 Scribe  Attestation  By signing my name below, I, Neha BRODERICK LPN, Scribe   attest that this documentation has been prepared under the direction and in the presence of Oscar Joy MD.     Provider Attestation - Scribe documentation    All medical record entries made by the Scribe were at my direction and personally dictated by me. I have reviewed the chart and agree that the record accurately reflects my personal performance of the history, physical exam, discussion and plan.      denies

## 2025-05-09 NOTE — PATIENT INSTRUCTIONS
Please bring all medicines, vitamins, and herbal supplements with you when you come to the office.    Prescriptions will not be filled unless you are compliant with your follow up appointments or have a follow up appointment scheduled as per instruction of your physician. Refills should be requested at the time of your visit.     BMI was above normal measurement. Current weight: 132 kg (291 lb)  Weight change since last visit (-) denotes wt loss 11 lbs   Weight loss needed to achieve BMI 25: 101.9 Lbs  Weight loss needed to achieve BMI 30: 64.1 Lbs  Provided instructions on dietary changes  Provided instructions on exercise.    Resume crestor  Lab work   Follow up 6 months

## 2025-05-09 NOTE — LETTER
May 9, 2025     Desiree Toledo, APRN-[facility]  [address] [place] [address]    Patient: Anthony Mauricio   YOB: 1965   Date of Visit: 5/9/2025       Dear Dr. Desiree Toledo, APRN-CNP:    Thank you for referring Anthony Mauricio to me for evaluation. Below are my notes for this consultation.  If you have questions, please do not hesitate to call me. I look forward to following your patient along with you.       Sincerely,     Oscar Joy MD      CC: No Recipients  ______________________________________________________________________________________    Chief Complaint   Patient presents with   • Follow-up     Hypertension        Subjective   Anthony Mauricio is a 59 y.o. male     HPI     Patient is in the office for follow-up for CAD and previous angioplasty in 2024 of 2 vessels involving the RCA and the left anterior descending artery.  He has quit smoking since he was last seen which is great.  He continues to work without any restrictions.  For some reason his statin was discontinued by his PCP.  He has no side effect of the statin.  He reports no angina orthopnea PND or lower extremity edema he remains in class II obesity.  He maintains very active lifestyle.  Examination outside of his class II obesity were essentially normal.    Assessment/recommendations:     1-coronary artery disease involving the LAD and the right coronary artery status post PCI July 2024 with excellent result, his left main and left circumflex are normal, ejection fraction is normal.  Currently on dual antiplatelet therapy, for some reason his statin was discontinued by PCP without any complaint from the patient.  He will be place back on rosuvastatin 20 mg daily and follow his labs in 2 months.  2-essential hypertension currently under control, recommended continue current medications, weight loss was highly recommended, DASH diet  3-obstructive sleep apnea compliant with CPAP machine  4-class II obesity, encouragement  "provided for low calorie diet and more exercise.  Review of Systems   All other systems reviewed and are negative.           Vitals:    05/09/25 1443   BP: 128/86   BP Location: Left arm   Patient Position: Sitting   Pulse: 78   Weight: 132 kg (291 lb)   Height: 1.854 m (6' 1\")        Objective   Physical Exam  Constitutional:       Appearance: Normal appearance.   HENT:      Nose: Nose normal.   Neck:      Vascular: No carotid bruit.   Cardiovascular:      Rate and Rhythm: Normal rate.      Pulses: Normal pulses.      Heart sounds: Normal heart sounds.   Pulmonary:      Effort: Pulmonary effort is normal.   Abdominal:      General: Bowel sounds are normal.      Palpations: Abdomen is soft.   Musculoskeletal:         General: Normal range of motion.      Cervical back: Normal range of motion.      Right lower leg: No edema.      Left lower leg: No edema.   Skin:     General: Skin is warm and dry.   Neurological:      General: No focal deficit present.      Mental Status: He is alert.   Psychiatric:         Mood and Affect: Mood normal.         Behavior: Behavior normal.         Thought Content: Thought content normal.         Judgment: Judgment normal.         Allergies  Penicillins     Current Medications  Current Outpatient Medications   Medication Instructions   • aspirin 81 mg, oral, Daily   • clopidogrel (PLAVIX) 75 mg, Daily   • losartan-hydrochlorothiazide (Hyzaar) 100-12.5 mg tablet 1 tablet, oral, Daily   • multivitamin tablet 1 tablet, Daily   • rosuvastatin (CRESTOR) 20 mg, oral, Daily   • varenicline tartrate (CHANTIX) 1 mg, Daily                        Assessment/Plan   1. Coronary artery disease involving native coronary artery of native heart without angina pectoris  Alanine Aminotransferase    Basic Metabolic Panel    CBC    Lipid Panel    Follow Up In Cardiology    Aspartate Aminotransferase    Alanine Aminotransferase    Basic Metabolic Panel    CBC    Lipid Panel    Aspartate Aminotransferase    "   2. Elevated coronary artery calcium score  rosuvastatin (Crestor) 20 mg tablet      3. Essential hypertension  Follow Up In Cardiology    Basic Metabolic Panel    Basic Metabolic Panel      4. S/P PTCA (percutaneous transluminal coronary angioplasty)        5. Obstructive sleep apnea (adult) (pediatric)        6. BMI 38.0-38.9,adult        7. Former smoker        8. Mixed hyperlipidemia  Alanine Aminotransferase    Lipid Panel    Aspartate Aminotransferase    Alanine Aminotransferase    Lipid Panel    Aspartate Aminotransferase      9. Class 2 obesity                 Scribe Attestation  By signing my name below, Neha CAMACHO LPN, Scribe   attest that this documentation has been prepared under the direction and in the presence of Oscar Joy MD.     Provider Attestation - Scribe documentation    All medical record entries made by the Scribe were at my direction and personally dictated by me. I have reviewed the chart and agree that the record accurately reflects my personal performance of the history, physical exam, discussion and plan.

## 2025-07-21 DIAGNOSIS — I25.10 CORONARY ARTERY DISEASE INVOLVING NATIVE CORONARY ARTERY OF NATIVE HEART WITHOUT ANGINA PECTORIS: ICD-10-CM

## 2025-07-21 DIAGNOSIS — I10 ESSENTIAL HYPERTENSION: ICD-10-CM

## 2025-07-21 RX ORDER — LOSARTAN POTASSIUM AND HYDROCHLOROTHIAZIDE 12.5; 1 MG/1; MG/1
1 TABLET ORAL DAILY
Qty: 90 TABLET | Refills: 3 | Status: SHIPPED | OUTPATIENT
Start: 2025-07-21 | End: 2026-07-21

## 2026-01-06 ENCOUNTER — APPOINTMENT (OUTPATIENT)
Dept: CARDIOLOGY | Facility: CLINIC | Age: 61
End: 2026-01-06
Payer: COMMERCIAL